# Patient Record
Sex: FEMALE | Race: WHITE | NOT HISPANIC OR LATINO | Employment: UNEMPLOYED | URBAN - METROPOLITAN AREA
[De-identification: names, ages, dates, MRNs, and addresses within clinical notes are randomized per-mention and may not be internally consistent; named-entity substitution may affect disease eponyms.]

---

## 2024-10-18 PROBLEM — Z87.42 HISTORY OF PCOS: Status: ACTIVE | Noted: 2024-10-18

## 2024-10-28 ENCOUNTER — LAB (OUTPATIENT)
Dept: LAB | Facility: LAB | Age: 44
End: 2024-10-28
Payer: COMMERCIAL

## 2024-10-28 DIAGNOSIS — E66.01 CLASS 3 SEVERE OBESITY DUE TO EXCESS CALORIES WITH SERIOUS COMORBIDITY AND BODY MASS INDEX (BMI) OF 40.0 TO 44.9 IN ADULT: ICD-10-CM

## 2024-10-28 DIAGNOSIS — E66.813 CLASS 3 SEVERE OBESITY DUE TO EXCESS CALORIES WITH SERIOUS COMORBIDITY AND BODY MASS INDEX (BMI) OF 40.0 TO 44.9 IN ADULT: ICD-10-CM

## 2024-10-28 DIAGNOSIS — Z98.84 BARIATRIC SURGERY STATUS: ICD-10-CM

## 2024-10-28 LAB
25(OH)D3 SERPL-MCNC: 24 NG/ML (ref 30–100)
ALBUMIN SERPL BCP-MCNC: 4.2 G/DL (ref 3.4–5)
ALP SERPL-CCNC: 72 U/L (ref 33–110)
ALT SERPL W P-5'-P-CCNC: 13 U/L (ref 7–45)
ANION GAP SERPL CALC-SCNC: 12 MMOL/L (ref 10–20)
AST SERPL W P-5'-P-CCNC: 12 U/L (ref 9–39)
BASOPHILS # BLD AUTO: 0.05 X10*3/UL (ref 0–0.1)
BASOPHILS NFR BLD AUTO: 0.6 %
BILIRUB SERPL-MCNC: 0.4 MG/DL (ref 0–1.2)
BUN SERPL-MCNC: 9 MG/DL (ref 6–23)
CALCIUM SERPL-MCNC: 9.3 MG/DL (ref 8.6–10.3)
CHLORIDE SERPL-SCNC: 101 MMOL/L (ref 98–107)
CHOLEST SERPL-MCNC: 261 MG/DL (ref 0–199)
CHOLESTEROL/HDL RATIO: 7.2
CO2 SERPL-SCNC: 28 MMOL/L (ref 21–32)
CREAT SERPL-MCNC: 0.63 MG/DL (ref 0.5–1.05)
EGFRCR SERPLBLD CKD-EPI 2021: >90 ML/MIN/1.73M*2
EOSINOPHIL # BLD AUTO: 0.15 X10*3/UL (ref 0–0.7)
EOSINOPHIL NFR BLD AUTO: 1.7 %
ERYTHROCYTE [DISTWIDTH] IN BLOOD BY AUTOMATED COUNT: 12.8 % (ref 11.5–14.5)
EST. AVERAGE GLUCOSE BLD GHB EST-MCNC: 111 MG/DL
FERRITIN SERPL-MCNC: 32 NG/ML (ref 8–150)
GLUCOSE SERPL-MCNC: 99 MG/DL (ref 74–99)
HBA1C MFR BLD: 5.5 %
HCT VFR BLD AUTO: 45.1 % (ref 36–46)
HDLC SERPL-MCNC: 36.3 MG/DL
HGB BLD-MCNC: 15 G/DL (ref 12–16)
IMM GRANULOCYTES # BLD AUTO: 0.04 X10*3/UL (ref 0–0.7)
IMM GRANULOCYTES NFR BLD AUTO: 0.4 % (ref 0–0.9)
INSULIN P FAST SERPL-ACNC: 23 UIU/ML (ref 3–25)
IRON SATN MFR SERPL: 28 % (ref 25–45)
IRON SERPL-MCNC: 116 UG/DL (ref 35–150)
LDLC SERPL CALC-MCNC: 162 MG/DL
LYMPHOCYTES # BLD AUTO: 2.62 X10*3/UL (ref 1.2–4.8)
LYMPHOCYTES NFR BLD AUTO: 28.9 %
MCH RBC QN AUTO: 30.9 PG (ref 26–34)
MCHC RBC AUTO-ENTMCNC: 33.3 G/DL (ref 32–36)
MCV RBC AUTO: 93 FL (ref 80–100)
MONOCYTES # BLD AUTO: 0.31 X10*3/UL (ref 0.1–1)
MONOCYTES NFR BLD AUTO: 3.4 %
NEUTROPHILS # BLD AUTO: 5.91 X10*3/UL (ref 1.2–7.7)
NEUTROPHILS NFR BLD AUTO: 65 %
NON HDL CHOLESTEROL: 225 MG/DL (ref 0–149)
NRBC BLD-RTO: 0 /100 WBCS (ref 0–0)
PLATELET # BLD AUTO: 481 X10*3/UL (ref 150–450)
POTASSIUM SERPL-SCNC: 3.9 MMOL/L (ref 3.5–5.3)
PROT SERPL-MCNC: 6.7 G/DL (ref 6.4–8.2)
RBC # BLD AUTO: 4.86 X10*6/UL (ref 4–5.2)
SODIUM SERPL-SCNC: 137 MMOL/L (ref 136–145)
TIBC SERPL-MCNC: 419 UG/DL (ref 240–445)
TRIGL SERPL-MCNC: 314 MG/DL (ref 0–149)
TSH SERPL-ACNC: 1.8 MIU/L (ref 0.44–3.98)
UIBC SERPL-MCNC: 303 UG/DL (ref 110–370)
VIT B12 SERPL-MCNC: 196 PG/ML (ref 211–911)
VLDL: 63 MG/DL (ref 0–40)
WBC # BLD AUTO: 9.1 X10*3/UL (ref 4.4–11.3)

## 2024-10-28 PROCEDURE — 82607 VITAMIN B-12: CPT

## 2024-10-28 PROCEDURE — 85025 COMPLETE CBC W/AUTO DIFF WBC: CPT

## 2024-10-28 PROCEDURE — 80053 COMPREHEN METABOLIC PANEL: CPT

## 2024-10-28 PROCEDURE — 36415 COLL VENOUS BLD VENIPUNCTURE: CPT

## 2024-10-28 PROCEDURE — 83550 IRON BINDING TEST: CPT

## 2024-10-28 PROCEDURE — 80061 LIPID PANEL: CPT

## 2024-10-28 PROCEDURE — 82728 ASSAY OF FERRITIN: CPT

## 2024-10-28 PROCEDURE — 84443 ASSAY THYROID STIM HORMONE: CPT

## 2024-10-28 PROCEDURE — 80323 ALKALOIDS NOS: CPT

## 2024-10-28 PROCEDURE — 83540 ASSAY OF IRON: CPT

## 2024-10-28 PROCEDURE — 83525 ASSAY OF INSULIN: CPT

## 2024-10-28 PROCEDURE — 82306 VITAMIN D 25 HYDROXY: CPT

## 2024-10-28 PROCEDURE — 83036 HEMOGLOBIN GLYCOSYLATED A1C: CPT

## 2024-11-01 LAB
COTININE SERPL-MCNC: 174 NG/ML
NICOTINE SERPL-MCNC: 7 NG/ML

## 2024-11-13 ENCOUNTER — TELEPHONE (OUTPATIENT)
Dept: OBSTETRICS AND GYNECOLOGY | Facility: CLINIC | Age: 44
End: 2024-11-13
Payer: COMMERCIAL

## 2024-11-13 NOTE — TELEPHONE ENCOUNTER
Patient called stating she has a EMB tomorrow at 3:20 pm but she started her period. Should she still come in? Please advise. Thank you.

## 2024-11-21 NOTE — PROGRESS NOTES
Patient ID: Tatyana Marie is a 43 y.o. female, here for endometrial biopsy    Endometrial biopsy    Date/Time: 11/21/2024 6:52 PM    Performed by: Janine Multani MD  Authorized by: Janine Multani MD    Consent:     Consent obtained: written    Consent given by: patient    Risks discussed: bleeding and pain    Patient agrees, verbalizes understanding, and wants to proceed: yes      Procedure explained and questions answered to patient or proxy's satisfaction: yes    Indications:     Indications: abnormal uterine bleeding and thickened endometrium    Pre-procedure:     Urine pregnancy test: negative      Premeds: NSAID    Procedure:     A bimanual exam was performed: no      Prepped with: Betadine    Tenaculum used: no      A local block was performed: no      Local anesthetic: none    Cervix dilated: no      Number of passes: 1  Findings:     Cervix: normal      Uterus depth by sound (cm): 8    Specimen collected: specimen collected and sent to pathology      Patient tolerance: tolerated well, no immediate complications  Comments:     Procedure comments: Menstrual calendar reviewed, bleeding every day for the last 6 weeks, just stopped bleeding a few days ago. She states that her continuous bleeding is affecting her QOL and she desires definitive treatment, like hysterectomy. Information about TLH, provided. She has a prior H/o bilateral salpingectomy.    SUBJECTIVE  Tatyana Marie is a 43 y.o. female here for endometrial biopsy and to discuss management options.  She has a history of continuous menorrhagia for 1 year, pelvic pain , dyspareunia and urinary incontinence.  Pelvic ultrasound was positive for thickened endometrium    Gyn:   Menstrual Pattern: Continuous bleeding for last 6 weeks  STIs: denies  Sexual Activity: men, monogamous, no complaints  Contraception: Tubal Sterilization    [unfilled]  Past Medical History:   Diagnosis Date    Acute maxillary sinusitis, unspecified 07/19/2021    Acute maxillary  sinusitis    Acute maxillary sinusitis, unspecified 07/19/2021    Acute maxillary sinusitis    Depression     Encounter for preprocedural cardiovascular examination 07/20/2021    Preoperative cardiovascular examination    Kidney stone     Migraine     Nausea 06/06/2019    Nausea in adult    Otalgia, left ear 12/10/2018    Acute otalgia, left    Otalgia, right ear 09/14/2017    Otalgia, right    Other abnormal auditory perceptions, bilateral 09/14/2017    Abnormal auditory perception of both ears    Other chest pain 01/18/2022    Chest wall pain    Other chest pain 01/20/2022    Costochondral chest pain    Other conditions influencing health status 01/21/2021    History of cough    Other conditions influencing health status 01/21/2021    History of cough    Other specified health status     No pertinent past medical history    Otitis media, unspecified, right ear 07/03/2017    Right otitis media    Pain in right ankle and joints of right foot 02/09/2017    Acute right ankle pain    Pain in right foot 02/09/2017    Acute foot pain, right    Personal history of other (healed) physical injury and trauma 05/04/2022    History of sprain of ankle    Personal history of other diseases of the respiratory system 12/10/2018    History of acute sinusitis    Personal history of other diseases of the respiratory system 11/20/2017    History of acute bronchitis    Personal history of other diseases of the respiratory system 11/20/2017    History of streptococcal pharyngitis    Personal history of other diseases of the respiratory system 09/20/2019    History of sore throat    Personal history of other specified conditions 03/12/2021    History of tachycardia    Pleurodynia 02/09/2022    Rib pain on left side    Seizure-like activity (Multi) 03/19/2023    Strain of muscle and tendon of front wall of thorax, initial encounter 01/18/2022    Intercostal muscle strain, initial encounter    Strain of muscle and tendon of front wall of  thorax, subsequent encounter 02/09/2022    Intercostal muscle strain, subsequent encounter    Unspecified acute conjunctivitis, unspecified eye 04/28/2022    Acute bacterial conjunctivitis    Urinary tract infection       Past Surgical History:   Procedure Laterality Date    BREAST BIOPSY Right     core bx    CHOLECYSTECTOMY  03/12/2021    Cholecystectomy    FOOT SURGERY  03/12/2021    Foot Surgery    HERNIA REPAIR      OTHER SURGICAL HISTORY  06/10/2021    Ankle surgery    TUBAL LIGATION  03/12/2021    Tubal Ligation        OBJECTIVE  /90   Wt 96.6 kg (213 lb)   LMP 11/12/2024 (Exact Date)   BMI 38.96 kg/m²      General:   Alert and oriented, in no acute distress   Neck: Supple. No visible thyromegaly.    Breast/Axilla: Normal to palpation bilaterally without masses, skin changes, or nipple discharge.    Abdomen: Soft, non-tender, without masses or organomegaly   Vulva: Normal architecture without erythema, masses, or lesions.    Vagina: Normal mucosa without lesions, masses, or atrophy. No abnormal vaginal discharge.    Cervix: Normal without masses, lesions, or signs of cervicitis.    Uterus: Normal mobile, non-enlarged uterus    Adnexa: Normal without masses or lesions   Pelvic Floor No POP noted. No high tone pelvic floor    Psych Normal affect. Normal mood.      Problem List Items Addressed This Visit       Menorrhagia with irregular cycle    Relevant Orders    Endometrial biopsy (Completed)    Surgical Pathology Exam    Mixed stress and urge urinary incontinence - Primary    Pelvic pain in female     Other Visit Diagnoses       Thickened endometrium        Relevant Orders    Endometrial biopsy (Completed)    Surgical Pathology Exam           IMPRESSIONS:    1. Menorrhagia with irregular cycle    - Endometrial biopsy  - Surgical Pathology Exam  -Discussed management options including Mirena IUD, endometrial ablation and hysterectomy.  Patient is not interested in IUD as she does not require  contraception and has pelvic pain.  She is not interested in endometrial ablation as she is not sure that this will take care of her pelvic pain.  Discussed total laparoscopic hysterectomy with preservation of both ovaries.  Information provided.  Patient desires to schedule TLH, pending endometrial biopsy results  2. Thickened endometrium    - Endometrial biopsy  - Surgical Pathology Exam    3. Mixed stress and urge urinary incontinence (Primary)  This is not currently affecting quality of life.  Will address if it does in the future    4. Pelvic pain in female  As above       Janine Multani MD

## 2024-11-22 ENCOUNTER — APPOINTMENT (OUTPATIENT)
Dept: OBSTETRICS AND GYNECOLOGY | Facility: CLINIC | Age: 44
End: 2024-11-22
Payer: COMMERCIAL

## 2024-11-22 VITALS — WEIGHT: 213 LBS | BODY MASS INDEX: 38.96 KG/M2 | DIASTOLIC BLOOD PRESSURE: 90 MMHG | SYSTOLIC BLOOD PRESSURE: 148 MMHG

## 2024-11-22 DIAGNOSIS — N39.46 MIXED STRESS AND URGE URINARY INCONTINENCE: Primary | ICD-10-CM

## 2024-11-22 DIAGNOSIS — N92.1 MENORRHAGIA WITH IRREGULAR CYCLE: ICD-10-CM

## 2024-11-22 DIAGNOSIS — R10.2 PELVIC PAIN IN FEMALE: ICD-10-CM

## 2024-11-22 DIAGNOSIS — R93.89 THICKENED ENDOMETRIUM: ICD-10-CM

## 2024-12-06 LAB
LABORATORY COMMENT REPORT: NORMAL
PATH REPORT.COMMENTS IMP SPEC: NORMAL
PATH REPORT.FINAL DX SPEC: NORMAL
PATH REPORT.GROSS SPEC: NORMAL
PATH REPORT.RELEVANT HX SPEC: NORMAL
PATH REPORT.TOTAL CANCER: NORMAL
RESIDENT REVIEW: NORMAL

## 2024-12-12 ENCOUNTER — OFFICE VISIT (OUTPATIENT)
Dept: CARDIOLOGY | Facility: HOSPITAL | Age: 44
End: 2024-12-12
Payer: COMMERCIAL

## 2024-12-12 VITALS
SYSTOLIC BLOOD PRESSURE: 132 MMHG | BODY MASS INDEX: 39.2 KG/M2 | WEIGHT: 213 LBS | HEIGHT: 62 IN | HEART RATE: 102 BPM | DIASTOLIC BLOOD PRESSURE: 80 MMHG

## 2024-12-12 DIAGNOSIS — I47.10 PSVT (PAROXYSMAL SUPRAVENTRICULAR TACHYCARDIA) (CMS-HCC): ICD-10-CM

## 2024-12-12 DIAGNOSIS — I10 BENIGN ESSENTIAL HYPERTENSION: Primary | ICD-10-CM

## 2024-12-12 PROBLEM — F12.90 MARIJUANA USER: Status: ACTIVE | Noted: 2024-12-12

## 2024-12-12 PROBLEM — G89.29 CHRONIC ANKLE PAIN: Status: ACTIVE | Noted: 2023-03-01

## 2024-12-12 PROBLEM — M25.673 DECREASED RANGE OF MOTION OF ANKLE: Status: ACTIVE | Noted: 2023-03-19

## 2024-12-12 PROBLEM — F32.9 MAJOR DEPRESSIVE DISORDER: Status: ACTIVE | Noted: 2022-10-25

## 2024-12-12 PROBLEM — R94.31 ABNORMAL ELECTROCARDIOGRAPHY: Status: ACTIVE | Noted: 2022-09-20

## 2024-12-12 PROBLEM — G47.33 OBSTRUCTIVE SLEEP APNEA SYNDROME: Status: ACTIVE | Noted: 2023-07-19

## 2024-12-12 PROBLEM — M25.579 CHRONIC ANKLE PAIN: Status: ACTIVE | Noted: 2023-03-01

## 2024-12-12 PROCEDURE — 3075F SYST BP GE 130 - 139MM HG: CPT | Performed by: INTERNAL MEDICINE

## 2024-12-12 PROCEDURE — 99214 OFFICE O/P EST MOD 30 MIN: CPT | Mod: 25 | Performed by: INTERNAL MEDICINE

## 2024-12-12 PROCEDURE — 3079F DIAST BP 80-89 MM HG: CPT | Performed by: INTERNAL MEDICINE

## 2024-12-12 PROCEDURE — 93005 ELECTROCARDIOGRAM TRACING: CPT | Performed by: INTERNAL MEDICINE

## 2024-12-12 PROCEDURE — 99214 OFFICE O/P EST MOD 30 MIN: CPT | Performed by: INTERNAL MEDICINE

## 2024-12-12 PROCEDURE — 3008F BODY MASS INDEX DOCD: CPT | Performed by: INTERNAL MEDICINE

## 2024-12-12 PROCEDURE — 93010 ELECTROCARDIOGRAM REPORT: CPT | Performed by: INTERNAL MEDICINE

## 2024-12-12 RX ORDER — NORETHINDRONE 5 MG/1
5 TABLET ORAL DAILY
COMMUNITY
Start: 2024-02-20 | End: 2024-12-12 | Stop reason: WASHOUT

## 2024-12-12 ASSESSMENT — ENCOUNTER SYMPTOMS
OCCASIONAL FEELINGS OF UNSTEADINESS: 0
LOSS OF SENSATION IN FEET: 0
DEPRESSION: 0

## 2024-12-12 NOTE — PROGRESS NOTES
"Chief Complaint:   Annual Exam (yearly)     History Of Present Illness:    Tatyana Marie is a 44 y.o. female presenting for annual follow-up.    She has a past medical history of inappropriate sinus tachycardia, palpitations without any significant arrhythmia other than perhaps SVT or inappropriate sinus tachycardia, history of recurrent vasovagal syncope, possible seizures, hyperlipidemia, hypertension, BMI of 39.  History of tobacco use.  Tells me she did not qualify for bariatric surgery.  Last year lost insurance for a while and stopped all medications.  Symptoms are quite unchanged.  Still gets occasional syncopal spells and palpitations that do not last more than few minutes.  She is getting workup for POTS syndrome.     Last Recorded Vitals:  Vitals:    12/12/24 1142   BP: 132/80   BP Location: Left arm   Pulse: 102   Weight: 96.6 kg (213 lb)   Height: 1.575 m (5' 2\")       Past Medical History:  She has a past medical history of Acute maxillary sinusitis, unspecified (07/19/2021), Acute maxillary sinusitis, unspecified (07/19/2021), Depression, Encounter for preprocedural cardiovascular examination (07/20/2021), Kidney stone, Migraine, Nausea (06/06/2019), Otalgia, left ear (12/10/2018), Otalgia, right ear (09/14/2017), Other abnormal auditory perceptions, bilateral (09/14/2017), Other chest pain (01/18/2022), Other chest pain (01/20/2022), Other conditions influencing health status (01/21/2021), Other conditions influencing health status (01/21/2021), Other specified health status, Otitis media, unspecified, right ear (07/03/2017), Pain in right ankle and joints of right foot (02/09/2017), Pain in right foot (02/09/2017), Personal history of other (healed) physical injury and trauma (05/04/2022), Personal history of other diseases of the respiratory system (12/10/2018), Personal history of other diseases of the respiratory system (11/20/2017), Personal history of other diseases of the respiratory system " (11/20/2017), Personal history of other diseases of the respiratory system (09/20/2019), Personal history of other specified conditions (03/12/2021), Pleurodynia (02/09/2022), Seizure-like activity (Multi) (03/19/2023), Strain of muscle and tendon of front wall of thorax, initial encounter (01/18/2022), Strain of muscle and tendon of front wall of thorax, subsequent encounter (02/09/2022), Unspecified acute conjunctivitis, unspecified eye (04/28/2022), and Urinary tract infection.    Past Surgical History:  She has a past surgical history that includes Cholecystectomy (03/12/2021); Other surgical history (06/10/2021); Foot surgery (03/12/2021); Tubal ligation (03/12/2021); Hernia repair; and Breast biopsy (Right).      Social History:  She reports that she has been smoking cigarettes. She has a 7.5 pack-year smoking history. She has never used smokeless tobacco. She reports current drug use. Frequency: 7.00 times per week. Drug: Marijuana. She reports that she does not drink alcohol.    Family History:  Family History   Problem Relation Name Age of Onset    Hypertension Mother Mother     Breast cancer Mother Mother     Hypertension Father Norm     Diabetes Father Norm     Hypertension Maternal Grandmother      Hypertension Maternal Grandfather Juan mark     Hernia Maternal Grandfather Juan mark         Allergies:  Hydrocodone-acetaminophen and Penicillins    Outpatient Medications:  Current Outpatient Medications   Medication Instructions    norethindrone (AYGESTIN) 5 mg, Daily       Physical Exam:  Physical Exam  Vitals reviewed.   Constitutional:       Appearance: Normal appearance.   Neck:      Vascular: No carotid bruit or JVD.   Cardiovascular:      Rate and Rhythm: Normal rate and regular rhythm.      Pulses: Normal pulses.      Heart sounds: Normal heart sounds, S1 normal and S2 normal.   Pulmonary:      Effort: Pulmonary effort is normal. No respiratory distress.      Breath sounds: No  wheezing or rales.   Abdominal:      General: Abdomen is flat.      Palpations: Abdomen is soft.   Musculoskeletal:      Right lower leg: No edema.      Left lower leg: No edema.   Skin:     General: Skin is warm.   Neurological:      Mental Status: She is alert and oriented to person, place, and time. Mental status is at baseline.   Psychiatric:         Mood and Affect: Mood normal.         Behavior: Behavior normal.           Last Labs:  CBC -  Lab Results   Component Value Date    WBC 9.1 10/28/2024    HGB 15.0 10/28/2024    HCT 45.1 10/28/2024    MCV 93 10/28/2024     (H) 10/28/2024       CMP -  Lab Results   Component Value Date    CALCIUM 9.3 10/28/2024    PROT 6.7 10/28/2024    ALBUMIN 4.2 10/28/2024    AST 12 10/28/2024    ALT 13 10/28/2024    ALKPHOS 72 10/28/2024    BILITOT 0.4 10/28/2024       LIPID PANEL -   Lab Results   Component Value Date    CHOL 261 (H) 10/28/2024    TRIG 314 (H) 10/28/2024    HDL 36.3 10/28/2024    CHHDL 7.2 10/28/2024    LDLF 71 05/12/2023    VLDL 63 (H) 10/28/2024    NHDL 225 (H) 10/28/2024       RENAL FUNCTION PANEL -   Lab Results   Component Value Date    GLUCOSE 99 10/28/2024     10/28/2024    K 3.9 10/28/2024     10/28/2024    CO2 28 10/28/2024    ANIONGAP 12 10/28/2024    BUN 9 10/28/2024    CREATININE 0.63 10/28/2024    CALCIUM 9.3 10/28/2024    ALBUMIN 4.2 10/28/2024        Lab Results   Component Value Date    HGBA1C 5.5 10/28/2024       Last Cardiology Tests:  ECG:  ECG 12 lead (Clinic Performed) 10/17/2023      Echo:  Transthoracic echo (TTE) complete 11/15/2023      Ejection Fractions:  EF   Date/Time Value Ref Range Status   11/15/2023 10:40 AM 64         Cath:  No results found for this or any previous visit from the past 1095 days.      Stress Test:  Nuclear Stress Test 11/20/2023      Cardiac Imaging:  No results found for this or any previous visit from the past 1095 days.          Assessment/Plan     Benign essential hypertension-BP goals  discussed.  Discussed weight loss. FU with PCP        She is on statins primary physician.  We recommend exercise and weight loss at this time and healthy diet.  She has now stopped her statins.  Advised to follow-up with PCP to restart.  Will check lipid profile and calcium score next year.  Patient does not have family history of premature CAD.        Heart palpitations-Repeat monitor shows PSVT, brief, symptoms mainly due to sinus tachycardia.  She was on 2 AV ibeth blockers-however she stopped both of those when she lost her insurance and she states that it does not bother her much she would rather not take medications..  We had arranged for a few event recorder's in the past which did not reveal significant arrhythmia has been evaluated by EP as well who feels this is inappropriate sinus tachycardia and ongoing medical management was recommended.        PSVT (paroxysmal supraventricular tachycardia)-EP evaluation done felt to be inappropriate sinus tachycardia        She is having worsening shortness of breath with activities.  Stress test and echo were normal.  Suspect noncardiac cause.  Patient was advised to quit smoking.             Say Rowland MD

## 2024-12-13 ENCOUNTER — PREP FOR PROCEDURE (OUTPATIENT)
Dept: OBSTETRICS AND GYNECOLOGY | Facility: CLINIC | Age: 44
End: 2024-12-13
Payer: COMMERCIAL

## 2024-12-13 DIAGNOSIS — R10.2 PELVIC PAIN IN FEMALE: ICD-10-CM

## 2024-12-13 DIAGNOSIS — N92.1 MENORRHAGIA WITH IRREGULAR CYCLE: Primary | ICD-10-CM

## 2024-12-13 LAB
ATRIAL RATE: 102 BPM
P AXIS: 55 DEGREES
P OFFSET: 203 MS
P ONSET: 128 MS
PR INTERVAL: 196 MS
Q ONSET: 226 MS
QRS COUNT: 17 BEATS
QRS DURATION: 90 MS
QT INTERVAL: 364 MS
QTC CALCULATION(BAZETT): 474 MS
QTC FREDERICIA: 434 MS
R AXIS: -39 DEGREES
T AXIS: 33 DEGREES
T OFFSET: 408 MS
VENTRICULAR RATE: 102 BPM

## 2024-12-13 RX ORDER — CEFAZOLIN SODIUM 2 G/100ML
2 INJECTION, SOLUTION INTRAVENOUS ONCE
OUTPATIENT
Start: 2024-12-13 | End: 2024-12-13

## 2024-12-13 RX ORDER — CELECOXIB 400 MG/1
400 CAPSULE ORAL ONCE
OUTPATIENT
Start: 2024-12-13 | End: 2024-12-13

## 2024-12-13 RX ORDER — GABAPENTIN 600 MG/1
600 TABLET ORAL ONCE
OUTPATIENT
Start: 2024-12-13 | End: 2024-12-13

## 2025-03-25 DIAGNOSIS — R00.2 HEART PALPITATIONS: Primary | ICD-10-CM

## 2025-03-25 NOTE — TELEPHONE ENCOUNTER
Refill on Propranolol 80 mg  1 daily    To Joselo Mathew    (Last seen 8/2023--she scheduled appointment on 8-12-25)

## 2025-03-27 RX ORDER — PROPRANOLOL HYDROCHLORIDE 80 MG/1
80 CAPSULE, EXTENDED RELEASE ORAL DAILY
Qty: 30 CAPSULE | Refills: 11 | Status: SHIPPED | OUTPATIENT
Start: 2025-03-27 | End: 2026-03-27

## 2025-03-28 ENCOUNTER — ANESTHESIA EVENT (OUTPATIENT)
Dept: OPERATING ROOM | Facility: HOSPITAL | Age: 45
End: 2025-03-28
Payer: COMMERCIAL

## 2025-04-10 ENCOUNTER — APPOINTMENT (OUTPATIENT)
Dept: OBSTETRICS AND GYNECOLOGY | Facility: CLINIC | Age: 45
End: 2025-04-10
Payer: COMMERCIAL

## 2025-04-14 PROBLEM — Z01.818 VISIT FOR PRE-OPERATIVE EXAMINATION: Status: ACTIVE | Noted: 2025-04-14

## 2025-04-14 NOTE — PROGRESS NOTES
BRITTNEY  Tatyana Marie is a 44 y.o. female here for pre op visit  Scheduled for TLH, BS on 4/23/25  H/o menorrhagia, pelvic pain and urinary incontinence  EMB was benign, suggestive of polyp  # 1 - Date: None, Sex: None, Weight: None, GA: None, Type: None, Apgar1: None, Apgar5: None, Living: None, Birth Comments: None    # 2 - Date: None, Sex: None, Weight: None, GA: None, Type: None, Apgar1: None, Apgar5: None, Living: None, Birth Comments: None      Gyn:   Menstrual Pattern:   STIs: denies  Sexual Activity: men, monogamous, no complaints  Contraception: Tubal Sterilization    [unfilled]  Past Medical History:   Diagnosis Date    Abnormal ECG     Acute maxillary sinusitis, unspecified 07/19/2021    Acute maxillary sinusitis    Acute maxillary sinusitis, unspecified 07/19/2021    Acute maxillary sinusitis    Anxiety     Chronic pain disorder     Depression     Dizziness     Encounter for preprocedural cardiovascular examination 07/20/2021    Preoperative cardiovascular examination    Gestational diabetes     Hypertension     Kidney stone     Migraine     Nausea 06/06/2019    Nausea in adult    Obesity     Otalgia, left ear 12/10/2018    Acute otalgia, left    Otalgia, right ear 09/14/2017    Otalgia, right    Other abnormal auditory perceptions, bilateral 09/14/2017    Abnormal auditory perception of both ears    Other chest pain 01/18/2022    Chest wall pain    Other chest pain 01/20/2022    Costochondral chest pain    Other conditions influencing health status 01/21/2021    History of cough    Other conditions influencing health status 01/21/2021    History of cough    Other specified health status     No pertinent past medical history    Otitis media, unspecified, right ear 07/03/2017    Right otitis media    Pain in right ankle and joints of right foot 02/09/2017    Acute right ankle pain    Pain in right foot 02/09/2017    Acute foot pain, right    Personal history of other (healed) physical injury and trauma  05/04/2022    History of sprain of ankle    Personal history of other diseases of the respiratory system 12/10/2018    History of acute sinusitis    Personal history of other diseases of the respiratory system 11/20/2017    History of acute bronchitis    Personal history of other diseases of the respiratory system 11/20/2017    History of streptococcal pharyngitis    Personal history of other diseases of the respiratory system 09/20/2019    History of sore throat    Personal history of other specified conditions 03/12/2021    History of tachycardia    Pleurodynia 02/09/2022    Rib pain on left side    Polycystic ovarian disease     Seizure-like activity (Multi) 03/19/2023    Strain of muscle and tendon of front wall of thorax, initial encounter 01/18/2022    Intercostal muscle strain, initial encounter    Strain of muscle and tendon of front wall of thorax, subsequent encounter 02/09/2022    Intercostal muscle strain, subsequent encounter    Unspecified acute conjunctivitis, unspecified eye 04/28/2022    Acute bacterial conjunctivitis    Urinary tract infection       Past Surgical History:   Procedure Laterality Date    ANKLE ARTHROPLASTY  4/2022    BREAST BIOPSY Right     core bx    CHOLECYSTECTOMY  03/12/2021    Cholecystectomy    FOOT SURGERY  03/12/2021    Foot Surgery    HERNIA REPAIR      OTHER SURGICAL HISTORY  06/10/2021    Ankle surgery    TUBAL LIGATION  03/12/2021    Tubal Ligation        OBJECTIVE  LMP 03/24/2025 (Exact Date)      General:   Alert and oriented, in no acute distress   Neck: Supple. No visible thyromegaly.    Breast/Axilla: Normal to palpation bilaterally without masses, skin changes, or nipple discharge.    Respiratory: Clear to auscultation bilaterally, normal breath sounds   Cardiovascular: Regular rate and rhythm, no murmurs   Abdomen: Soft, non-tender, without masses or organomegaly   Vulva: Normal architecture without erythema, masses, or lesions.    Vagina: Normal mucosa without  lesions, masses, or atrophy. No abnormal vaginal discharge.    Cervix: Normal without masses, lesions, or signs of cervicitis.    Uterus: Normal mobile, non-enlarged uterus    Adnexa: Normal without masses or lesions   Pelvic Floor No POP noted. No high tone pelvic floor    Psych Normal affect. Normal mood.      Problem List Items Addressed This Visit       Menorrhagia with irregular cycle - Primary    Pelvic pain in female    Visit for pre-operative examination      IMPRESSIONS:  Preoperative visit  Surgical counseling completed and all questions answered.  Preoperative instructions provided.  Surgical consent signed.  More than 50% of time was utilized for counseling    There are no diagnoses linked to this encounter.     Janine Multani MD

## 2025-04-17 ENCOUNTER — APPOINTMENT (OUTPATIENT)
Dept: OBSTETRICS AND GYNECOLOGY | Facility: CLINIC | Age: 45
End: 2025-04-17
Payer: COMMERCIAL

## 2025-04-17 VITALS
DIASTOLIC BLOOD PRESSURE: 98 MMHG | BODY MASS INDEX: 40.12 KG/M2 | SYSTOLIC BLOOD PRESSURE: 138 MMHG | HEIGHT: 62 IN | WEIGHT: 218 LBS

## 2025-04-17 DIAGNOSIS — Z01.818 VISIT FOR PRE-OPERATIVE EXAMINATION: ICD-10-CM

## 2025-04-17 DIAGNOSIS — N92.1 MENORRHAGIA WITH IRREGULAR CYCLE: Primary | ICD-10-CM

## 2025-04-17 DIAGNOSIS — R10.2 PELVIC PAIN IN FEMALE: ICD-10-CM

## 2025-04-17 PROCEDURE — 99213 OFFICE O/P EST LOW 20 MIN: CPT | Performed by: OBSTETRICS & GYNECOLOGY

## 2025-04-17 PROCEDURE — 3080F DIAST BP >= 90 MM HG: CPT | Performed by: OBSTETRICS & GYNECOLOGY

## 2025-04-17 PROCEDURE — 3008F BODY MASS INDEX DOCD: CPT | Performed by: OBSTETRICS & GYNECOLOGY

## 2025-04-17 PROCEDURE — 3075F SYST BP GE 130 - 139MM HG: CPT | Performed by: OBSTETRICS & GYNECOLOGY

## 2025-04-23 ENCOUNTER — PHARMACY VISIT (OUTPATIENT)
Dept: PHARMACY | Facility: CLINIC | Age: 45
End: 2025-04-23
Payer: MEDICAID

## 2025-04-23 ENCOUNTER — HOSPITAL ENCOUNTER (OUTPATIENT)
Facility: HOSPITAL | Age: 45
Setting detail: OUTPATIENT SURGERY
Discharge: HOME | End: 2025-04-23
Attending: OBSTETRICS & GYNECOLOGY | Admitting: OBSTETRICS & GYNECOLOGY
Payer: COMMERCIAL

## 2025-04-23 ENCOUNTER — ANESTHESIA (OUTPATIENT)
Dept: OPERATING ROOM | Facility: HOSPITAL | Age: 45
End: 2025-04-23
Payer: COMMERCIAL

## 2025-04-23 VITALS
BODY MASS INDEX: 39.2 KG/M2 | OXYGEN SATURATION: 96 % | HEART RATE: 85 BPM | DIASTOLIC BLOOD PRESSURE: 101 MMHG | HEIGHT: 62 IN | SYSTOLIC BLOOD PRESSURE: 159 MMHG | RESPIRATION RATE: 16 BRPM | WEIGHT: 213 LBS | TEMPERATURE: 97.7 F

## 2025-04-23 DIAGNOSIS — Z90.710 STATUS POST LAPAROSCOPIC HYSTERECTOMY: Primary | ICD-10-CM

## 2025-04-23 DIAGNOSIS — N92.1 MENORRHAGIA WITH IRREGULAR CYCLE: ICD-10-CM

## 2025-04-23 DIAGNOSIS — R10.2 PELVIC PAIN IN FEMALE: ICD-10-CM

## 2025-04-23 LAB
GLUCOSE BLD MANUAL STRIP-MCNC: 89 MG/DL (ref 74–99)
PREGNANCY TEST URINE, POC: NEGATIVE

## 2025-04-23 PROCEDURE — 2500000001 HC RX 250 WO HCPCS SELF ADMINISTERED DRUGS (ALT 637 FOR MEDICARE OP): Performed by: OBSTETRICS & GYNECOLOGY

## 2025-04-23 PROCEDURE — 3700000002 HC GENERAL ANESTHESIA TIME - EACH INCREMENTAL 1 MINUTE: Performed by: OBSTETRICS & GYNECOLOGY

## 2025-04-23 PROCEDURE — 58570 TLH UTERUS 250 G OR LESS: CPT | Performed by: OBSTETRICS & GYNECOLOGY

## 2025-04-23 PROCEDURE — 88307 TISSUE EXAM BY PATHOLOGIST: CPT | Performed by: STUDENT IN AN ORGANIZED HEALTH CARE EDUCATION/TRAINING PROGRAM

## 2025-04-23 PROCEDURE — 7100000001 HC RECOVERY ROOM TIME - INITIAL BASE CHARGE: Performed by: OBSTETRICS & GYNECOLOGY

## 2025-04-23 PROCEDURE — 3700000001 HC GENERAL ANESTHESIA TIME - INITIAL BASE CHARGE: Performed by: OBSTETRICS & GYNECOLOGY

## 2025-04-23 PROCEDURE — 7100000010 HC PHASE TWO TIME - EACH INCREMENTAL 1 MINUTE: Performed by: OBSTETRICS & GYNECOLOGY

## 2025-04-23 PROCEDURE — 2500000004 HC RX 250 GENERAL PHARMACY W/ HCPCS (ALT 636 FOR OP/ED): Mod: JZ | Performed by: NURSE ANESTHETIST, CERTIFIED REGISTERED

## 2025-04-23 PROCEDURE — 7100000002 HC RECOVERY ROOM TIME - EACH INCREMENTAL 1 MINUTE: Performed by: OBSTETRICS & GYNECOLOGY

## 2025-04-23 PROCEDURE — 2500000004 HC RX 250 GENERAL PHARMACY W/ HCPCS (ALT 636 FOR OP/ED): Mod: JZ | Performed by: ANESTHESIOLOGY

## 2025-04-23 PROCEDURE — 81025 URINE PREGNANCY TEST: CPT | Performed by: OBSTETRICS & GYNECOLOGY

## 2025-04-23 PROCEDURE — 3600000009 HC OR TIME - EACH INCREMENTAL 1 MINUTE - PROCEDURE LEVEL FOUR: Performed by: OBSTETRICS & GYNECOLOGY

## 2025-04-23 PROCEDURE — 2720000007 HC OR 272 NO HCPCS: Performed by: OBSTETRICS & GYNECOLOGY

## 2025-04-23 PROCEDURE — 7100000009 HC PHASE TWO TIME - INITIAL BASE CHARGE: Performed by: OBSTETRICS & GYNECOLOGY

## 2025-04-23 PROCEDURE — 82947 ASSAY GLUCOSE BLOOD QUANT: CPT

## 2025-04-23 PROCEDURE — 3600000004 HC OR TIME - INITIAL BASE CHARGE - PROCEDURE LEVEL FOUR: Performed by: OBSTETRICS & GYNECOLOGY

## 2025-04-23 PROCEDURE — 88307 TISSUE EXAM BY PATHOLOGIST: CPT | Mod: TC,PORLAB | Performed by: OBSTETRICS & GYNECOLOGY

## 2025-04-23 PROCEDURE — 2500000004 HC RX 250 GENERAL PHARMACY W/ HCPCS (ALT 636 FOR OP/ED): Mod: JZ | Performed by: OBSTETRICS & GYNECOLOGY

## 2025-04-23 PROCEDURE — RXMED WILLOW AMBULATORY MEDICATION CHARGE

## 2025-04-23 PROCEDURE — 2500000001 HC RX 250 WO HCPCS SELF ADMINISTERED DRUGS (ALT 637 FOR MEDICARE OP): Performed by: NURSE ANESTHETIST, CERTIFIED REGISTERED

## 2025-04-23 RX ORDER — LIDOCAINE HYDROCHLORIDE 10 MG/ML
0.1 INJECTION, SOLUTION EPIDURAL; INFILTRATION; INTRACAUDAL; PERINEURAL ONCE
Status: DISCONTINUED | OUTPATIENT
Start: 2025-04-23 | End: 2025-04-23 | Stop reason: HOSPADM

## 2025-04-23 RX ORDER — ROCURONIUM BROMIDE 10 MG/ML
INJECTION, SOLUTION INTRAVENOUS AS NEEDED
Status: DISCONTINUED | OUTPATIENT
Start: 2025-04-23 | End: 2025-04-23

## 2025-04-23 RX ORDER — FAMOTIDINE 10 MG/ML
20 INJECTION, SOLUTION INTRAVENOUS ONCE
Status: COMPLETED | OUTPATIENT
Start: 2025-04-23 | End: 2025-04-23

## 2025-04-23 RX ORDER — OXYCODONE HYDROCHLORIDE 5 MG/1
5 TABLET ORAL EVERY 6 HOURS PRN
Qty: 12 TABLET | Refills: 0 | Status: SHIPPED | OUTPATIENT
Start: 2025-04-23 | End: 2025-04-30

## 2025-04-23 RX ORDER — ALBUTEROL SULFATE 90 UG/1
INHALANT RESPIRATORY (INHALATION) AS NEEDED
Status: DISCONTINUED | OUTPATIENT
Start: 2025-04-23 | End: 2025-04-23

## 2025-04-23 RX ORDER — ONDANSETRON HYDROCHLORIDE 2 MG/ML
INJECTION, SOLUTION INTRAVENOUS AS NEEDED
Status: DISCONTINUED | OUTPATIENT
Start: 2025-04-23 | End: 2025-04-23

## 2025-04-23 RX ORDER — SODIUM CHLORIDE, SODIUM LACTATE, POTASSIUM CHLORIDE, CALCIUM CHLORIDE 600; 310; 30; 20 MG/100ML; MG/100ML; MG/100ML; MG/100ML
100 INJECTION, SOLUTION INTRAVENOUS CONTINUOUS
Status: DISCONTINUED | OUTPATIENT
Start: 2025-04-23 | End: 2025-04-23 | Stop reason: HOSPADM

## 2025-04-23 RX ORDER — MEPERIDINE HYDROCHLORIDE 25 MG/ML
12.5 INJECTION INTRAMUSCULAR; INTRAVENOUS; SUBCUTANEOUS EVERY 10 MIN PRN
Status: DISCONTINUED | OUTPATIENT
Start: 2025-04-23 | End: 2025-04-23 | Stop reason: HOSPADM

## 2025-04-23 RX ORDER — ESMOLOL HYDROCHLORIDE 10 MG/ML
INJECTION INTRAVENOUS AS NEEDED
Status: DISCONTINUED | OUTPATIENT
Start: 2025-04-23 | End: 2025-04-23

## 2025-04-23 RX ORDER — DIPHENHYDRAMINE HYDROCHLORIDE 50 MG/ML
12.5 INJECTION, SOLUTION INTRAMUSCULAR; INTRAVENOUS ONCE AS NEEDED
Status: DISCONTINUED | OUTPATIENT
Start: 2025-04-23 | End: 2025-04-23 | Stop reason: HOSPADM

## 2025-04-23 RX ORDER — GABAPENTIN 300 MG/1
600 CAPSULE ORAL ONCE
Status: COMPLETED | OUTPATIENT
Start: 2025-04-23 | End: 2025-04-23

## 2025-04-23 RX ORDER — ACETAMINOPHEN 325 MG/1
1000 TABLET ORAL EVERY 6 HOURS PRN
Qty: 80 TABLET | Refills: 0 | Status: SHIPPED | OUTPATIENT
Start: 2025-04-23 | End: 2025-05-03

## 2025-04-23 RX ORDER — LIDOCAINE HYDROCHLORIDE 20 MG/ML
INJECTION, SOLUTION INFILTRATION; PERINEURAL AS NEEDED
Status: DISCONTINUED | OUTPATIENT
Start: 2025-04-23 | End: 2025-04-23

## 2025-04-23 RX ORDER — ONDANSETRON HYDROCHLORIDE 2 MG/ML
4 INJECTION, SOLUTION INTRAVENOUS ONCE AS NEEDED
Status: DISCONTINUED | OUTPATIENT
Start: 2025-04-23 | End: 2025-04-23 | Stop reason: HOSPADM

## 2025-04-23 RX ORDER — LABETALOL HYDROCHLORIDE 5 MG/ML
5 INJECTION, SOLUTION INTRAVENOUS ONCE AS NEEDED
Status: DISCONTINUED | OUTPATIENT
Start: 2025-04-23 | End: 2025-04-23 | Stop reason: HOSPADM

## 2025-04-23 RX ORDER — SODIUM CHLORIDE, SODIUM LACTATE, POTASSIUM CHLORIDE, CALCIUM CHLORIDE 600; 310; 30; 20 MG/100ML; MG/100ML; MG/100ML; MG/100ML
75 INJECTION, SOLUTION INTRAVENOUS CONTINUOUS
Status: DISCONTINUED | OUTPATIENT
Start: 2025-04-23 | End: 2025-04-23 | Stop reason: HOSPADM

## 2025-04-23 RX ORDER — PROPOFOL 10 MG/ML
INJECTION, EMULSION INTRAVENOUS AS NEEDED
Status: DISCONTINUED | OUTPATIENT
Start: 2025-04-23 | End: 2025-04-23

## 2025-04-23 RX ORDER — CELECOXIB 200 MG/1
400 CAPSULE ORAL ONCE
Status: COMPLETED | OUTPATIENT
Start: 2025-04-23 | End: 2025-04-23

## 2025-04-23 RX ORDER — HYDRALAZINE HYDROCHLORIDE 20 MG/ML
5 INJECTION INTRAMUSCULAR; INTRAVENOUS EVERY 30 MIN PRN
Status: DISCONTINUED | OUTPATIENT
Start: 2025-04-23 | End: 2025-04-23 | Stop reason: HOSPADM

## 2025-04-23 RX ORDER — DROPERIDOL 2.5 MG/ML
0.62 INJECTION, SOLUTION INTRAMUSCULAR; INTRAVENOUS ONCE AS NEEDED
Status: DISCONTINUED | OUTPATIENT
Start: 2025-04-23 | End: 2025-04-23 | Stop reason: HOSPADM

## 2025-04-23 RX ORDER — MORPHINE SULFATE 2 MG/ML
2 INJECTION, SOLUTION INTRAMUSCULAR; INTRAVENOUS EVERY 5 MIN PRN
Status: DISCONTINUED | OUTPATIENT
Start: 2025-04-23 | End: 2025-04-23 | Stop reason: HOSPADM

## 2025-04-23 RX ORDER — ACETAMINOPHEN 650 MG/1
SUPPOSITORY RECTAL AS NEEDED
Status: DISCONTINUED | OUTPATIENT
Start: 2025-04-23 | End: 2025-04-23 | Stop reason: HOSPADM

## 2025-04-23 RX ORDER — CEFAZOLIN SODIUM 2 G/50ML
2 SOLUTION INTRAVENOUS ONCE
Status: COMPLETED | OUTPATIENT
Start: 2025-04-23 | End: 2025-04-23

## 2025-04-23 RX ORDER — KETOROLAC TROMETHAMINE 30 MG/ML
INJECTION, SOLUTION INTRAMUSCULAR; INTRAVENOUS AS NEEDED
Status: DISCONTINUED | OUTPATIENT
Start: 2025-04-23 | End: 2025-04-23

## 2025-04-23 RX ORDER — FENTANYL CITRATE 50 UG/ML
INJECTION, SOLUTION INTRAMUSCULAR; INTRAVENOUS AS NEEDED
Status: DISCONTINUED | OUTPATIENT
Start: 2025-04-23 | End: 2025-04-23

## 2025-04-23 RX ORDER — MIDAZOLAM HYDROCHLORIDE 1 MG/ML
INJECTION, SOLUTION INTRAMUSCULAR; INTRAVENOUS AS NEEDED
Status: DISCONTINUED | OUTPATIENT
Start: 2025-04-23 | End: 2025-04-23

## 2025-04-23 RX ORDER — ALBUTEROL SULFATE 0.83 MG/ML
2.5 SOLUTION RESPIRATORY (INHALATION) ONCE AS NEEDED
Status: DISCONTINUED | OUTPATIENT
Start: 2025-04-23 | End: 2025-04-23 | Stop reason: HOSPADM

## 2025-04-23 RX ORDER — IBUPROFEN 600 MG/1
600 TABLET ORAL EVERY 6 HOURS PRN
Qty: 60 TABLET | Refills: 0 | Status: SHIPPED | OUTPATIENT
Start: 2025-04-23 | End: 2025-05-08

## 2025-04-23 RX ADMIN — ROCURONIUM BROMIDE 10 MG: 10 INJECTION, SOLUTION INTRAVENOUS at 13:20

## 2025-04-23 RX ADMIN — PROPOFOL 200 MG: 10 INJECTION, EMULSION INTRAVENOUS at 11:59

## 2025-04-23 RX ADMIN — KETOROLAC TROMETHAMINE 30 MG: 30 INJECTION, SOLUTION INTRAMUSCULAR at 13:57

## 2025-04-23 RX ADMIN — FENTANYL CITRATE 100 MCG: 50 INJECTION INTRAMUSCULAR; INTRAVENOUS at 11:59

## 2025-04-23 RX ADMIN — HYDROMORPHONE HYDROCHLORIDE 0.5 MG: 0.5 INJECTION, SOLUTION INTRAMUSCULAR; INTRAVENOUS; SUBCUTANEOUS at 14:40

## 2025-04-23 RX ADMIN — FENTANYL CITRATE 50 MCG: 50 INJECTION INTRAMUSCULAR; INTRAVENOUS at 12:39

## 2025-04-23 RX ADMIN — MEPERIDINE HYDROCHLORIDE 12.5 MG: 25 INJECTION INTRAMUSCULAR; INTRAVENOUS; SUBCUTANEOUS at 14:32

## 2025-04-23 RX ADMIN — ESMOLOL HYDROCHLORIDE 20 MG: 10 INJECTION, SOLUTION INTRAVENOUS at 13:57

## 2025-04-23 RX ADMIN — FAMOTIDINE 20 MG: 10 INJECTION, SOLUTION INTRAVENOUS at 11:40

## 2025-04-23 RX ADMIN — FENTANYL CITRATE 50 MCG: 50 INJECTION INTRAMUSCULAR; INTRAVENOUS at 13:00

## 2025-04-23 RX ADMIN — SODIUM CHLORIDE, POTASSIUM CHLORIDE, SODIUM LACTATE AND CALCIUM CHLORIDE: 600; 310; 30; 20 INJECTION, SOLUTION INTRAVENOUS at 11:53

## 2025-04-23 RX ADMIN — GABAPENTIN 600 MG: 300 CAPSULE ORAL at 11:39

## 2025-04-23 RX ADMIN — LIDOCAINE HYDROCHLORIDE 100 MG: 20 INJECTION, SOLUTION INFILTRATION; PERINEURAL at 11:59

## 2025-04-23 RX ADMIN — ALBUTEROL SULFATE 2 PUFF: 90 AEROSOL, METERED RESPIRATORY (INHALATION) at 13:57

## 2025-04-23 RX ADMIN — ROCURONIUM BROMIDE 10 MG: 10 INJECTION, SOLUTION INTRAVENOUS at 13:30

## 2025-04-23 RX ADMIN — ONDANSETRON 4 MG: 2 INJECTION INTRAMUSCULAR; INTRAVENOUS at 11:59

## 2025-04-23 RX ADMIN — SUGAMMADEX 400 MG: 100 INJECTION, SOLUTION INTRAVENOUS at 13:57

## 2025-04-23 RX ADMIN — ESMOLOL HYDROCHLORIDE 20 MG: 10 INJECTION, SOLUTION INTRAVENOUS at 12:02

## 2025-04-23 RX ADMIN — ROCURONIUM BROMIDE 20 MG: 10 INJECTION, SOLUTION INTRAVENOUS at 12:57

## 2025-04-23 RX ADMIN — CELECOXIB 400 MG: 200 CAPSULE ORAL at 11:39

## 2025-04-23 RX ADMIN — DEXAMETHASONE SODIUM PHOSPHATE 8 MG: 4 INJECTION INTRA-ARTICULAR; INTRALESIONAL; INTRAMUSCULAR; INTRAVENOUS; SOFT TISSUE at 11:59

## 2025-04-23 RX ADMIN — ROCURONIUM BROMIDE 50 MG: 10 INJECTION, SOLUTION INTRAVENOUS at 11:59

## 2025-04-23 RX ADMIN — CEFAZOLIN SODIUM 2 G: 2 SOLUTION INTRAVENOUS at 11:54

## 2025-04-23 RX ADMIN — MIDAZOLAM 2 MG: 1 INJECTION INTRAMUSCULAR; INTRAVENOUS at 11:59

## 2025-04-23 SDOH — HEALTH STABILITY: MENTAL HEALTH: CURRENT SMOKER: 1

## 2025-04-23 ASSESSMENT — PAIN SCALES - GENERAL
PAINLEVEL_OUTOF10: 0 - NO PAIN
PAINLEVEL_OUTOF10: 7
PAINLEVEL_OUTOF10: 0 - NO PAIN
PAINLEVEL_OUTOF10: 5 - MODERATE PAIN
PAINLEVEL_OUTOF10: 2
PAINLEVEL_OUTOF10: 1
PAINLEVEL_OUTOF10: 2
PAINLEVEL_OUTOF10: 0 - NO PAIN
PAINLEVEL_OUTOF10: 4
PAINLEVEL_OUTOF10: 1
PAIN_LEVEL: 4

## 2025-04-23 ASSESSMENT — PAIN - FUNCTIONAL ASSESSMENT
PAIN_FUNCTIONAL_ASSESSMENT: 0-10

## 2025-04-23 ASSESSMENT — COLUMBIA-SUICIDE SEVERITY RATING SCALE - C-SSRS
2. HAVE YOU ACTUALLY HAD ANY THOUGHTS OF KILLING YOURSELF?: NO
6. HAVE YOU EVER DONE ANYTHING, STARTED TO DO ANYTHING, OR PREPARED TO DO ANYTHING TO END YOUR LIFE?: NO
1. IN THE PAST MONTH, HAVE YOU WISHED YOU WERE DEAD OR WISHED YOU COULD GO TO SLEEP AND NOT WAKE UP?: NO

## 2025-04-23 NOTE — ANESTHESIA POSTPROCEDURE EVALUATION
Patient: Tatyana Marie    Procedure Summary       Date: 04/23/25 Room / Location: [facility] OR 02 / Virtual [facility] OR    Anesthesia Start: 1154 Anesthesia Stop: 1407    Procedure: Total Hysterectomy Laparoscopy with Bilateral Salpingectomy (HOMAFAR TO ASSIST) (Bilateral: Pelvis) Diagnosis:       Menorrhagia with irregular cycle      Pelvic pain in female      (Menorrhagia with irregular cycle [N92.1])      (Pelvic pain in female [R10.2])    Surgeons: Janine Multani MD Responsible Provider: GENNY Martinez    Anesthesia Type: general ASA Status: 2            Anesthesia Type: general    Vitals Value Taken Time   /94 04/23/25 14:50   Temp 36.1 °C (97 °F) 04/23/25 14:01   Pulse 92 04/23/25 14:50   Resp 14 04/23/25 14:50   SpO2 95 % 04/23/25 14:50       Anesthesia Post Evaluation    Patient location during evaluation: PACU  Patient participation: complete - patient participated  Level of consciousness: awake  Pain score: 4  Pain management: adequate  Airway patency: patent  Cardiovascular status: acceptable  Respiratory status: acceptable  Hydration status: acceptable  Postoperative Nausea and Vomiting: none        There were no known notable events for this encounter.

## 2025-04-23 NOTE — OP NOTE
Total Hysterectomy Laparoscopy with Bilateral Salpingectomy (HOMAFAR TO ASSIST) (B) Operative Note     Date: 2025  OR Location: [facility]    Name: Tatyana Marie, : 1980, Age: 44 y.o., MRN: [id number], Sex: female    Diagnosis  Pre-op Diagnosis      * Menorrhagia with irregular cycle [N92.1]     * Pelvic pain in female [R10.2] Post-op Diagnosis     * Menorrhagia with irregular cycle [N92.1]     * Pelvic pain in female [R10.2]     Procedures  Total Hysterectomy Laparoscopy with Bilateral Salpingectomy (HOMAFAR TO ASSIST)  38628 - NE LAPS TOTAL HYSTERECT 250 GM/< W/RMVL TUBE/OVARY      Surgeons      * Janine Multani - Primary    Resident/Fellow/Other Assistant:  Surgeons and Role:     * Delbert Pham MD - Assisting    Staff:   Circulator: Dulce Maria  Scrub Person: Cyndy  Surgical Assistant: Jus  Circulator:   Scrub Person:   Surgical Assistant:     Anesthesia Staff: CRNA: CHAZ Martinez-CRNA    Procedure Summary  Anesthesia: General  ASA: II  Estimated Blood Loss: 20 mL  Intra-op Medications:   Administrations occurring from 1215 to 1455 on 25:   Medication Name Total Dose   acetaminophen (Tylenol) suppository 650 mg   albuterol inhaler 2 puff   esmolol 10 mg/mL 20 mg   fentaNYL (Sublimaze) injection 50 mcg/mL 100 mcg   ketorolac (Toradol) IM injection 60 mg/2 mL 30 mg   rocuronium (ZeMuron) 50 mg/5 mL injection 40 mg   sugammadex (Bridion) 200 mg/2 mL injection 400 mg              Anesthesia Record               Intraprocedure I/O Totals       None           Specimen:   ID Type Source Tests Collected by Time   1 : UTERUS WITH CERVIX Tissue UTERUS WITH CERVIX SURGICAL PATHOLOGY EXAM Janine Multani MD 2025 1344                 Drains and/or Catheters: * None in log *    Tourniquet Times:         Implants:     Findings: Normal-sized uterus, normal ovary and right side, left ovarian hemorrhagic cyst, bilateral tubes surgically absent due to prior salpingectomy for tubal sterilization.   Grade 2 rectocele noted    Indications: Tatyana Marie is an 44 y.o. female who is having surgery for Menorrhagia with irregular cycle [N92.1]  Pelvic pain in female [R10.2].     The patient was seen in the preoperative area. The risks, benefits, complications, treatment options, non-operative alternatives, expected recovery and outcomes were discussed with the patient. The possibilities of reaction to medication, pulmonary aspiration, injury to surrounding structures, bleeding, recurrent infection, the need for additional procedures, failure to diagnose a condition, and creating a complication requiring transfusion or operation were discussed with the patient. The patient concurred with the proposed plan, giving informed consent.  The site of surgery was properly noted/marked if necessary per policy. The patient has been actively warmed in preoperative area. Preoperative antibiotics have been ordered and given within 1 hours of incision. Venous thrombosis prophylaxis have been ordered including bilateral sequential compression devices    Procedure Details: Patient was taken to the OR with IV fluid running and IV antibiotics on board.  OR huddle and timeout procedure was completed.  Patient was placed in dorsolithotomy position, prepped and draped in the usual sterile fashion.  Vaginal prep was performed and Flowers catheter was inserted.  Or timeout procedure was completed. A weighted posterior vaginal speculum was inserted and anterior lip of the cervix was grasped with single-tooth tenaculum, cervix was dilated with cervical dilators.  Uterus sounded to 8 cm.  The large cup VCARE intrauterine manipulator was inserted, and balloon was inflated.  Weighted speculum and tenaculum was removed.  Next the cervical Green cup was pushed up against the cervix and the secured in place.  This was inserted vaginally and intrauterine Next attention was turned to the abdomen and gloves were changed.  While I was inserting the  vaginal manipulator, Dr. Pham,  my assistant was working on the abdominal part of the procedure.  Small infraumbilical incision was made and a varies needle was inserted and at a 45 degree angle, intra-abdominal placement was confirmed with hanging drop test.  Carbon accent gas was insufflated with an opening pressure of 7 mmHg if insufflation was performed to the abdomen was tympanitic.  Varies needle was then removed and a 5 mm trocar and cannula was inserted through the opti view, and intra-abdominal placement was confirmed laparoscope.  Next 2 other incisions were made in the right and left lower quadrants, 8 cm above the symphysis pubis and 8 cm lateral to the midline.  To the right lower quadrant a 5 mm trocar and camera was inserted and through the left lower quadrant 10 to 12 mm trocar and cannula was inserted under direct visualization.  Thorough evaluation of the pelvic cavity revealed enlarged boggy appearing uterus, and normal-appearing ovary.  Left ovary with hemorrhagic corpus luteal cyst which was drained using the LigaSure device.  Good Hemostasis was noted. Good peristalsis of bilateral ureters was noted.  Bilateral tubes were noted to be absent due to previous tubal sterilization and bilateral salpingectomy.  Using the 5 mm LigaSure device the left utero-ovarian ligament was cautery ligated sected to separate the ovary from the uterus.  Next the left round ligament was cautery ligated and transected.  The bladder flap was created using blunt and sharp dissection.  Careful dissection was performed to separate bladder and pushed out of harm's way.  Next left uterine vessels were cautery ligated and transected. Attention was then turned to the right side and Dr. Pham, my assistant repeated the procedure on the right utero-ovarian ligament and the round ligament.  She also cautery ligated the right uterine vessels and transected them.  Good blanching of the uterus was noted.  Next using long  needle cautery a circumferential incision was performed on the vaginal cuff to separate the uterus from the vaginal cuff.  Good hemostasis was noted in the vaginal vaginal cuff area.  Next uterus was removed vaginally, through the vaginal cuff and sent to pathology.  The vaginal cuff was sutured with the V-Loc suture loaded onto Endo Stitch.  Thorough irrigation of the pelvic cavity was performed and no abnormal bleeding was noted with good hemostasis obtained.  Bilateral ureters were found to be peristalsing well at the end of procedure.  The instruments trocars were removed from the abdomen and carbon dioxide gas was evacuated.  The trocar sites were sutured with 4-0 Vicryl in a subcutaneous fashion.  Sponge lap needle and instrument counts were correct x2.  Patient tolerated the procedure well.  Flowers catheter was removed at the end of procedure and Tylenol suppository was inserted rectally at the end of procedure.  Patient was taken to the recovery room in stable condition.  Dr. Pham's assistance was absolutely essential for surgery due to the high complexity of the procedure and due to nonavailability of trained resident surgeon.  Evidence of Infection: No   Complications:  None; patient tolerated the procedure well.    Disposition: PACU - hemodynamically stable.  Condition: stable         Task Performed by RNFA or Surgical Assistant:            Additional Details:     Attending Attestation: I performed the procedure.    Janine Multani  Phone Number: [phone]

## 2025-04-23 NOTE — ANESTHESIA PROCEDURE NOTES
Airway  Date/Time: 4/23/2025 12:02 PM  Reason: elective    Airway not difficult    Staffing  Performed: CRNA   Authorized by: GENNY Martinez    Performed by: GENNY Martinez  Patient location during procedure: OR    Patient Condition  Indications for airway management: anesthesia and airway protection  Patient position: sniffing  No planned trial extubation  Sedation level: deep     Final Airway Details   Preoxygenated: yes  Final airway type: endotracheal airway  Successful airway: ETT  Cuffed: yes   Successful intubation technique: direct laryngoscopy  Adjuncts used in placement: intubating stylet  Endotracheal tube insertion site: oral  Blade: Armond  Blade size: #4  ETT size (mm): 7.0  Cormack-Lehane Classification: grade I - full view of glottis  Placement verified by: chest auscultation and capnometry   Cuff volume (mL): 7  Measured from: gums  ETT to gums (cm): 21  Number of attempts at approach: 1    Additional Comments  Dentition intact post intubation.

## 2025-04-23 NOTE — ANESTHESIA PREPROCEDURE EVALUATION
Patient: Tatyana Marie    Procedure Information       Date/Time: 04/23/25 1215    Procedure: Total Hysterectomy Laparoscopy with Bilateral Salpingectomy (HOMAFAR TO ASSIST) (Bilateral: Pelvis) - Total Hysterectomy Laparoscopy with Bilateral Salpingectomy    Location: [facility] 02 / [facility]    Surgeons: Janine Multani MD            Relevant Problems   Anesthesia (within normal limits)      Cardiac   (+) Abnormal electrocardiography   (+) Benign essential hypertension   (+) Hyperlipidemia   (+) Inappropriate sinus tachycardia (CMS-HCC)   (+) PSVT (paroxysmal supraventricular tachycardia) (CMS-HCC)      Neuro   (+) Generalized anxiety disorder   (+) Major depressive disorder   (+) Meralgia paresthetica   (+) Moderate episode of recurrent major depressive disorder   (+) Panic attacks   (+) Seizure (Multi)      GI   (+) Gastroesophageal reflux disease      /Renal   (+) Acute lower urinary tract infection      Endocrine   (+) Class 2 obesity with body mass index (BMI) of 39.0 to 39.9 in adult   (+) Class 3 severe obesity due to excess calories with body mass index (BMI) of 40.0 to 44.9 in adult      Musculoskeletal   (+) Primary osteoarthritis of both ankles      HEENT   (+) Seasonal allergies      ID   (+) Acute lower urinary tract infection   (+) Viral infection      GYN   (+) Menorrhagia with irregular cycle       Clinical information reviewed:   Tobacco  Allergies    Med Hx  Surg Hx   Fam Hx  Soc Hx        NPO Detail:  No data recorded     Physical Exam    Airway  Mallampati: II  TM distance: >3 FB  Neck ROM: full  Mouth opening: 3 or more finger widths     Cardiovascular - normal exam   Dental - normal exam     Pulmonary - normal exam   Abdominal            Anesthesia Plan    History of general anesthesia?: yes  History of complications of general anesthesia?: no    ASA 2     general     The patient is a current smoker.    intravenous induction   Anesthetic plan and risks discussed with patient.  Use of  blood products discussed with patient who consented to blood products.    Plan discussed with CRNA.

## 2025-05-05 LAB
LABORATORY COMMENT REPORT: NORMAL
PATH REPORT.COMMENTS IMP SPEC: NORMAL
PATH REPORT.FINAL DX SPEC: NORMAL
PATH REPORT.GROSS SPEC: NORMAL
PATH REPORT.RELEVANT HX SPEC: NORMAL
PATH REPORT.TOTAL CANCER: NORMAL

## 2025-05-08 ENCOUNTER — APPOINTMENT (OUTPATIENT)
Dept: OBSTETRICS AND GYNECOLOGY | Facility: CLINIC | Age: 45
End: 2025-05-08
Payer: COMMERCIAL

## 2025-05-08 VITALS — BODY MASS INDEX: 39.62 KG/M2 | WEIGHT: 216.6 LBS | SYSTOLIC BLOOD PRESSURE: 138 MMHG | DIASTOLIC BLOOD PRESSURE: 96 MMHG

## 2025-05-08 DIAGNOSIS — Z48.89 ENCOUNTER FOR POSTOPERATIVE WOUND CHECK: Primary | ICD-10-CM

## 2025-05-08 PROCEDURE — 3075F SYST BP GE 130 - 139MM HG: CPT | Performed by: OBSTETRICS & GYNECOLOGY

## 2025-05-08 PROCEDURE — 3080F DIAST BP >= 90 MM HG: CPT | Performed by: OBSTETRICS & GYNECOLOGY

## 2025-05-08 PROCEDURE — 99024 POSTOP FOLLOW-UP VISIT: CPT | Performed by: OBSTETRICS & GYNECOLOGY

## 2025-05-08 NOTE — PROGRESS NOTES
HPI  2 weeks PO visit s/p TLH , BS  OBGyn Exam   INCISIONS X 3: healing well  Review of Systems  NAD    Objective     NAD  Assessment/Plan   Problem List Items Addressed This Visit           ICD-10-CM    Encounter for postoperative wound check - Primary Z48.89   Doing well   Benign surgical pathology reviewed  F/u in 4 weeks for vaginal cuff check

## 2025-05-16 ENCOUNTER — HOSPITAL ENCOUNTER (OUTPATIENT)
Dept: RADIOLOGY | Facility: HOSPITAL | Age: 45
Discharge: HOME | End: 2025-05-16
Payer: COMMERCIAL

## 2025-05-16 DIAGNOSIS — I10 BENIGN ESSENTIAL HYPERTENSION: ICD-10-CM

## 2025-05-16 DIAGNOSIS — I47.10 PSVT (PAROXYSMAL SUPRAVENTRICULAR TACHYCARDIA): ICD-10-CM

## 2025-05-16 PROCEDURE — 75571 CT HRT W/O DYE W/CA TEST: CPT

## 2025-06-04 PROBLEM — Z90.710 STATUS POST LAPAROSCOPIC HYSTERECTOMY: Status: ACTIVE | Noted: 2025-06-04

## 2025-06-04 PROBLEM — R10.2 PELVIC PAIN IN FEMALE: Status: RESOLVED | Noted: 2024-11-22 | Resolved: 2025-06-04

## 2025-06-04 PROBLEM — N89.8 VAGINAL DISCHARGE: Status: RESOLVED | Noted: 2023-03-19 | Resolved: 2025-06-04

## 2025-06-04 PROBLEM — Z01.818 VISIT FOR PRE-OPERATIVE EXAMINATION: Status: RESOLVED | Noted: 2025-04-14 | Resolved: 2025-06-04

## 2025-06-04 PROBLEM — N92.1 MENORRHAGIA WITH IRREGULAR CYCLE: Status: RESOLVED | Noted: 2024-10-18 | Resolved: 2025-06-04

## 2025-06-04 NOTE — PROGRESS NOTES
HPI  6 weeks PO visit s/p TLH , BS  OBGyn Exam   INCISIONS X 3: healing well  Speculum exam : cuff is intact, healing well  Review of Systems  NAD        NAD  Assessment/Plan   Problem List Items Addressed This Visit           ICD-10-CM    Encounter for postoperative wound check - Primary Z48.89    Status post laparoscopic hysterectomy Z90.710     Doing well   Benign surgical pathology   Normal vaginal cuff check  Discussed pelvic rest with no coitus for 8 weeks   F/u as needed

## 2025-06-05 ENCOUNTER — APPOINTMENT (OUTPATIENT)
Dept: OBSTETRICS AND GYNECOLOGY | Facility: CLINIC | Age: 45
End: 2025-06-05
Payer: COMMERCIAL

## 2025-06-05 VITALS — SYSTOLIC BLOOD PRESSURE: 122 MMHG | DIASTOLIC BLOOD PRESSURE: 90 MMHG | WEIGHT: 219 LBS | BODY MASS INDEX: 40.06 KG/M2

## 2025-06-05 DIAGNOSIS — Z90.710 STATUS POST LAPAROSCOPIC HYSTERECTOMY: ICD-10-CM

## 2025-06-05 DIAGNOSIS — Z12.31 ENCOUNTER FOR SCREENING MAMMOGRAM FOR MALIGNANT NEOPLASM OF BREAST: ICD-10-CM

## 2025-06-05 DIAGNOSIS — Z48.89 ENCOUNTER FOR POSTOPERATIVE WOUND CHECK: Primary | ICD-10-CM

## 2025-06-05 PROCEDURE — 3074F SYST BP LT 130 MM HG: CPT | Performed by: OBSTETRICS & GYNECOLOGY

## 2025-06-05 PROCEDURE — 3080F DIAST BP >= 90 MM HG: CPT | Performed by: OBSTETRICS & GYNECOLOGY

## 2025-06-05 PROCEDURE — 99024 POSTOP FOLLOW-UP VISIT: CPT | Performed by: OBSTETRICS & GYNECOLOGY

## 2025-08-12 ENCOUNTER — APPOINTMENT (OUTPATIENT)
Dept: PRIMARY CARE | Facility: CLINIC | Age: 45
End: 2025-08-12
Payer: COMMERCIAL

## 2025-10-18 ENCOUNTER — APPOINTMENT (OUTPATIENT)
Dept: RADIOLOGY | Facility: HOSPITAL | Age: 45
End: 2025-10-18
Payer: COMMERCIAL

## 2026-07-10 ENCOUNTER — APPOINTMENT (OUTPATIENT)
Dept: OBSTETRICS AND GYNECOLOGY | Facility: CLINIC | Age: 46
End: 2026-07-10
Payer: COMMERCIAL

## (undated) DEVICE — DEVICE, SUTURE, ENDOSTITCH, 10 MM

## (undated) DEVICE — PAD, SANITARY, OBSTETRICAL, W/ADHSV STRIP,11 IN,LF

## (undated) DEVICE — DEVICE, FORCE TRIVERSE ELECTROSURGICAL

## (undated) DEVICE — SUTURE, RELOAD, ENDOSTITCH, V-LOC 180, 0 W/LOOPS

## (undated) DEVICE — COVER HANDLE LIGHT, STERIS, BLUE, STERILE

## (undated) DEVICE — RETRACTOR, CERVICAL CUP, VCARE, STANDARD

## (undated) DEVICE — DRAPE, UNDERBUTTOCKS, W/ 27IN FLUID POUCH

## (undated) DEVICE — APPLICATOR, CHLORAPREP, W/ORANGE TINT, 26ML

## (undated) DEVICE — PREP TRAY, SKIN, DRY, W/GLOVES

## (undated) DEVICE — SUTURE, VICRYL, 4-0, 18 IN, PS2, UNDYED

## (undated) DEVICE — RETRACTOR, CERVICAL CUP, VCARE, LARGE

## (undated) DEVICE — SOLUTION, IRRIGATION, SODIUM CHLORIDE 0.9%, 1000 ML, POUR BOTTLE

## (undated) DEVICE — SOLUTION, CHLORHEXIDINE, 4%, 4OZ

## (undated) DEVICE — SOLUTION, IRRIGATION, USP, SODIUM CHLORIDE 0.9%, 3000 ML

## (undated) DEVICE — NEEDLE, INSUFFLATION, 13GAX120MM, DISP

## (undated) DEVICE — TROCAR, OPTICAL, BLADELESS, 12MM, THREADED, 100MM LENGTH

## (undated) DEVICE — DRAPE, LEGGINGS, 48 X 31 IN, STERILE, LF

## (undated) DEVICE — TRAY, SURESTEP, URINE METER, 16FR, COMPLETE, W/STATLOCK

## (undated) DEVICE — LIGASURE, V SEALER/DIVIDER  5MM BLUNT TIP

## (undated) DEVICE — SCISSOR TIP, ENDOCUT, LAPAROSCOPIC

## (undated) DEVICE — PAD, GROUNDING, ELECTROSURGICAL, W/9 FT CABLE, POLYHESIVE II, ADULT, LF

## (undated) DEVICE — TROCAR, KII OPTICAL BLADELESS 5MM Z THREAD 100MM LNGTH

## (undated) DEVICE — SYRINGE, 50 CC, LUER LOCK

## (undated) DEVICE — GLOVE, PROTEXIS PI CLASSIC, SZ-6.5, PF, LF

## (undated) DEVICE — PROTECTOR, NERVE, ULNAR, PINK

## (undated) DEVICE — SLEEVE, KII, Z-THREAD, 5X100CM

## (undated) DEVICE — Device